# Patient Record
Sex: MALE | Race: WHITE
[De-identification: names, ages, dates, MRNs, and addresses within clinical notes are randomized per-mention and may not be internally consistent; named-entity substitution may affect disease eponyms.]

---

## 2020-09-12 ENCOUNTER — HOSPITAL ENCOUNTER (INPATIENT)
Dept: HOSPITAL 74 - YASAS | Age: 35
LOS: 1 days | Discharge: LEFT BEFORE DISCHARGE | DRG: 770 | End: 2020-09-13
Attending: ALLERGY & IMMUNOLOGY | Admitting: ALLERGY & IMMUNOLOGY
Payer: COMMERCIAL

## 2020-09-12 VITALS — BODY MASS INDEX: 23.7 KG/M2

## 2020-09-12 DIAGNOSIS — F11.23: Primary | ICD-10-CM

## 2020-09-12 DIAGNOSIS — M54.5: ICD-10-CM

## 2020-09-12 DIAGNOSIS — Z87.81: ICD-10-CM

## 2020-09-12 DIAGNOSIS — F17.210: ICD-10-CM

## 2020-09-12 DIAGNOSIS — G89.29: ICD-10-CM

## 2020-09-12 DIAGNOSIS — Z56.0: ICD-10-CM

## 2020-09-12 PROCEDURE — HZ2ZZZZ DETOXIFICATION SERVICES FOR SUBSTANCE ABUSE TREATMENT: ICD-10-PCS | Performed by: ALLERGY & IMMUNOLOGY

## 2020-09-12 PROCEDURE — U0003 INFECTIOUS AGENT DETECTION BY NUCLEIC ACID (DNA OR RNA); SEVERE ACUTE RESPIRATORY SYNDROME CORONAVIRUS 2 (SARS-COV-2) (CORONAVIRUS DISEASE [COVID-19]), AMPLIFIED PROBE TECHNIQUE, MAKING USE OF HIGH THROUGHPUT TECHNOLOGIES AS DESCRIBED BY CMS-2020-01-R: HCPCS

## 2020-09-12 RX ADMIN — HYDROXYZINE PAMOATE SCH: 25 CAPSULE ORAL at 23:24

## 2020-09-12 RX ADMIN — NICOTINE SCH: 21 PATCH TRANSDERMAL at 23:24

## 2020-09-12 SDOH — ECONOMIC STABILITY - INCOME SECURITY: UNEMPLOYMENT, UNSPECIFIED: Z56.0

## 2020-09-12 NOTE — B[facility].RME
Substance Use & Tx History





- Substance Use History


  ** Heroin


Substance amount: 8 bgas


Frequency of use: Daily


Substance route: Inhalation (ex: sniffing or snorting)


Date of Last Use: 20





- Last Treatment


Date of last treatment:  in [place]


Where was last treatment: Detox





Physical/Psych/Mental Status





- Behavior


Eye Contact: Normal





- Cooperativeness


Cooperativeness: Cooperative





- Thinking


Thought Processes: Logical


Thought content: Future oriented





- Physical Health Problems


Is patient presently having any pain?: No


Does patient presently have any injuries (include location): No


Does patient currently have a fever: No





COWS





- Scale


Resting Pulse: 0= [place] 80 or Below


Sweatin= Chills/Flushing


Restless Observation: 1= Difficult to Sit Still


Pupil Size: 1= Pupils >than Normal


Bone or Joint Aches: 2= Severe Diffuse Aches


Runny Nose/ Eye Tearin= Runny Nose/Eyes


GI Upset > 30mins: 2= Nausea/Diarrhea


Tremor Observation: 2= Slight Tremor Visible


Yawning Observation: 2= >3x During Session


Anxiety or Irritability: 2=Irritable/Anxious


Goose Flesh Skin: 3=Piloerection


COWS Score: 18
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
## 2020-09-13 VITALS — HEART RATE: 65 BPM | DIASTOLIC BLOOD PRESSURE: 79 MMHG | SYSTOLIC BLOOD PRESSURE: 129 MMHG | TEMPERATURE: 97.6 F

## 2020-09-13 LAB
ALBUMIN SERPL-MCNC: 4 G/DL (ref 3.4–5)
ALP SERPL-CCNC: 43 U/L (ref 45–117)
ALT SERPL-CCNC: 26 U/L (ref 13–61)
ANION GAP SERPL CALC-SCNC: 5 MMOL/L (ref 8–16)
AST SERPL-CCNC: 11 U/L (ref 15–37)
BILIRUB SERPL-MCNC: 1 MG/DL (ref 0.2–1)
BUN SERPL-MCNC: 12.7 MG/DL (ref 7–18)
CALCIUM SERPL-MCNC: 9.2 MG/DL (ref 8.5–10.1)
CHLORIDE SERPL-SCNC: 104 MMOL/L (ref 98–107)
CO2 SERPL-SCNC: 31 MMOL/L (ref 21–32)
CREAT SERPL-MCNC: 0.6 MG/DL (ref 0.55–1.3)
DEPRECATED RDW RBC AUTO: 12.3 % (ref 11.9–15.9)
GLUCOSE SERPL-MCNC: 112 MG/DL (ref 74–106)
HCT VFR BLD CALC: 47.7 % (ref 35.4–49)
HGB BLD-MCNC: 16.2 GM/DL (ref 11.7–16.9)
MCH RBC QN AUTO: 30.4 PG (ref 25.7–33.7)
MCHC RBC AUTO-ENTMCNC: 34 G/DL (ref 32–35.9)
MCV RBC: 89.5 FL (ref 80–96)
PLATELET # BLD AUTO: 232 K/MM3 (ref 134–434)
PMV BLD: 8.4 FL (ref 7.5–11.1)
POTASSIUM SERPLBLD-SCNC: 3.5 MMOL/L (ref 3.5–5.1)
PROT SERPL-MCNC: 8.4 G/DL (ref 6.4–8.2)
RBC # BLD AUTO: 5.33 M/MM3 (ref 4–5.6)
SODIUM SERPL-SCNC: 140 MMOL/L (ref 136–145)
WBC # BLD AUTO: 10.9 K/MM3 (ref 4–10)

## 2020-09-13 RX ADMIN — HYDROXYZINE PAMOATE SCH: 25 CAPSULE ORAL at 07:21

## 2020-09-13 RX ADMIN — HYDROXYZINE PAMOATE SCH: 25 CAPSULE ORAL at 10:21

## 2020-09-13 RX ADMIN — NICOTINE SCH: 21 PATCH TRANSDERMAL at 10:22

## 2020-09-13 NOTE — DS
[facility] Detox Discharge Summary


Admission Date: 


20





Discharge Date: 20





- History


Present History: Opioid Dependence


Additional Comments: 





35 years old male was admitted on 20 for opiate withdrawal sx management 

treating with methadone detox regiment 


mr horvath insists to leave the detox that "not working for me"


General Appearance: Yes: no Distress, not Irritable, moist palms, mild Anxious


HEENTM: Yes: Normocephalic, HUONG, Pharynx Normal


Respiratory: Yes: Within Normal Limits, Lungs Clear, Normal Breath Sounds


Neck: Yes: Supple, Trachea in good position


Breast: Yes: Within Normal Limits


Cardiology: Yes: Within Normal Limits, Regular Rhythm, Regular Rate, S1, S2


Abdominal: Yes: Within Normal Limits, Normal Bowel Sounds, Non Tender, Soft


Genitourinary: Yes: Within Normal Limits


Back: Yes: Muscle Spasm


Musculoskeletal: Yes: Back pain, Muscle Pain


Extremities: Yes: Normal Range of Motion, Tremors


Neurological: Yes: CNs II-XII NML intact, Fully Oriented, Alert, Motor Strength 

5/5


Integumentary: Yes: Dry (tattoes)


Lymphatic: Yes: Within Normal Limits


Pertinent Past History: 





time for discharge 52 minutes


treatment team met with mr horvath to discuss benefits of methadone completion 


mr horvath insists to leave the detox that "this is not for me"


encourage mr horvath to  narcan from pharmacy and consider medication 

assisted treatment program





- Physical Exam Results


Vital Signs: 


                                   Vital Signs











Temperature  97.6 F   20 08:47


 


Pulse Rate  65   20 08:47


 


Respiratory Rate  18   20 08:47


 


Blood Pressure  129/79   20 08:47


 


O2 Sat by Pulse Oximetry (%)  100   20 17:26











Pertinent Admission Physical Exam Findings: 





opiate withdrawal 


                                Laboratory Tests











  20





  07:30 07:30 07:30


 


WBC   10.9 H 


 


RBC   5.33 


 


Hgb   16.2 


 


Hct   47.7 


 


MCV   89.5 


 


MCH   30.4 


 


MCHC   34.0 


 


RDW   12.3 


 


Plt Count   232 


 


MPV   8.4 


 


Sodium    140


 


Potassium    3.5


 


Chloride    104


 


Carbon Dioxide    31


 


Anion Gap    5 L


 


BUN    12.7


 


Creatinine    0.6


 


Est GFR (CKD-EPI)AfAm    150.97


 


Est GFR (CKD-EPI)NonAf    130.26


 


Random Glucose    112 H


 


Calcium    9.2


 


Total Bilirubin    1.0


 


AST    11 L


 


ALT    26


 


Alkaline Phosphatase    43 L


 


Total Protein    8.4 H


 


Albumin    4.0


 


Syphilis Serology  Non-reactive  








covid pending





- Treatment


Hospital Course: Detox Protocol Followed


Patient has Accepted a Rehab Referral to: [facility]





- Medication


Discharge Medications: 


Ambulatory Orders





Naloxone HCl [Narcan] 4 mg NS ASDIR PRN #1 spray 20 











- Diagnosis


(1) Nicotine dependence


Status: Acute   


Qualifiers: 


   Nicotine product type: cigarettes   Substance use status: in withdrawal   

Qualified Code(s): F17.213 - Nicotine dependence, cigarettes, with withdrawal   





(2) Opioid dependence with withdrawal


Status: Acute   





- AMA


Did Patient Leave Against Medical Advice: Yes





COWS (PN)





- Opiate Withdrawal


Resting Pulse: 0= [place] 80 or Below


Sweatin= Chills/Flushing


Restless Observation: 0= Sits Still


Pupil Size: 1= Pupils >than Normal


Bone or Joint Aches: 2= Severe Diffuse Aches


Runny Nose/ Eye Tearin= None


GI Upset > 30mins: 2= Nausea/Diarrhea


Tremor Observation of Outstretched Hands: 2= Slight Tremor Visible


Yawning Observation: 0= None


Anxiety or Irritability: 2=Irritable/Anxious


Goose Flesh Skin: 3=Piloerection


COWS Score: 13

## 2020-09-14 NOTE — EKG
Test Reason : 

Blood Pressure : ***/*** mmHG

Vent. Rate : 064 BPM     Atrial Rate : 064 BPM

   P-R Int : 166 ms          QRS Dur : 086 ms

    QT Int : 396 ms       P-R-T Axes : 049 020 014 degrees

   QTc Int : 408 ms

 

NORMAL SINUS RHYTHM WITH SINUS ARRHYTHMIA

MINIMAL VOLTAGE CRITERIA FOR LVH, MAY BE NORMAL VARIANT

BORDERLINE ECG

NO PREVIOUS ECGS AVAILABLE

Confirmed by HOMER VALLADARES MD ([id number]) on 9/14/2020 11:43:38 AM

 

Referred By:             Confirmed By:HOMER VALLADARES MD